# Patient Record
Sex: FEMALE | URBAN - METROPOLITAN AREA
[De-identification: names, ages, dates, MRNs, and addresses within clinical notes are randomized per-mention and may not be internally consistent; named-entity substitution may affect disease eponyms.]

---

## 2020-01-04 ENCOUNTER — COMMUNICATION - HEALTHEAST (OUTPATIENT)
Dept: SCHEDULING | Facility: CLINIC | Age: 35
End: 2020-01-04

## 2021-06-04 NOTE — TELEPHONE ENCOUNTER
Pt fell down the stairs this morning - hurts to walk and move, it throbs and pt took Ibuprofen, pt missed stairs and went down on tailbone over about 5-6 steps.  Pain only in tailbone.  Pt intends to monitor and follow Care Advice and will f/u with clinic if needed.    Ramona Lee RN, MA  [facility] RN Triage Nurse Advisor      Reason for Disposition    Minor tailbone injury    Protocols used: TAILBONE INJURY-A-AH